# Patient Record
Sex: MALE | Race: WHITE | NOT HISPANIC OR LATINO | Employment: FULL TIME | URBAN - METROPOLITAN AREA
[De-identification: names, ages, dates, MRNs, and addresses within clinical notes are randomized per-mention and may not be internally consistent; named-entity substitution may affect disease eponyms.]

---

## 2022-08-28 ENCOUNTER — HOSPITAL ENCOUNTER (OUTPATIENT)
Facility: CLINIC | Age: 57
Setting detail: OBSERVATION
Discharge: HOME | End: 2022-08-29
Attending: HOSPITALIST | Admitting: INTERNAL MEDICINE
Payer: COMMERCIAL

## 2022-08-28 DIAGNOSIS — K92.1 GASTROINTESTINAL HEMORRHAGE WITH MELENA: ICD-10-CM

## 2022-08-28 DIAGNOSIS — M17.10 ARTHRITIS OF KNEE: Primary | ICD-10-CM

## 2022-08-28 DIAGNOSIS — K92.2 UPPER GI BLEED: ICD-10-CM

## 2022-08-28 LAB
ABO/RH(D): NORMAL
ALBUMIN SERPL BCG-MCNC: 3.8 G/DL (ref 3.5–5.2)
ALP SERPL-CCNC: 60 U/L (ref 40–129)
ALT SERPL W P-5'-P-CCNC: 18 U/L (ref 10–50)
ANION GAP SERPL CALCULATED.3IONS-SCNC: 7 MMOL/L (ref 7–15)
ANTIBODY SCREEN: NEGATIVE
AST SERPL W P-5'-P-CCNC: 23 U/L (ref 10–50)
BILIRUB SERPL-MCNC: 0.5 MG/DL
BUN SERPL-MCNC: 18.9 MG/DL (ref 6–20)
CALCIUM SERPL-MCNC: 8.1 MG/DL (ref 8.6–10)
CHLORIDE SERPL-SCNC: 105 MMOL/L (ref 98–107)
CREAT SERPL-MCNC: 0.88 MG/DL (ref 0.67–1.17)
DEPRECATED HCO3 PLAS-SCNC: 22 MMOL/L (ref 22–29)
ERYTHROCYTE [DISTWIDTH] IN BLOOD BY AUTOMATED COUNT: 14 % (ref 10–15)
GFR SERPL CREATININE-BSD FRML MDRD: >90 ML/MIN/1.73M2
GLUCOSE SERPL-MCNC: 116 MG/DL (ref 70–99)
HCT VFR BLD AUTO: 30.9 % (ref 40–53)
HGB BLD-MCNC: 10.1 G/DL (ref 13.3–17.7)
HGB BLD-MCNC: 10.2 G/DL (ref 13.3–17.7)
INR PPP: 1.16 (ref 0.85–1.15)
MAGNESIUM SERPL-MCNC: 1.8 MG/DL (ref 1.7–2.3)
MCH RBC QN AUTO: 28.3 PG (ref 26.5–33)
MCHC RBC AUTO-ENTMCNC: 32.7 G/DL (ref 31.5–36.5)
MCV RBC AUTO: 87 FL (ref 78–100)
PHOSPHATE SERPL-MCNC: 1.6 MG/DL (ref 2.5–4.5)
PHOSPHATE SERPL-MCNC: 3.1 MG/DL (ref 2.5–4.5)
PLATELET # BLD AUTO: 197 10E3/UL (ref 150–450)
POTASSIUM SERPL-SCNC: 3.9 MMOL/L (ref 3.4–5.3)
PROT SERPL-MCNC: 6.1 G/DL (ref 6.4–8.3)
RBC # BLD AUTO: 3.57 10E6/UL (ref 4.4–5.9)
SODIUM SERPL-SCNC: 134 MMOL/L (ref 136–145)
SPECIMEN EXPIRATION DATE: NORMAL
WBC # BLD AUTO: 10.3 10E3/UL (ref 4–11)

## 2022-08-28 PROCEDURE — 85018 HEMOGLOBIN: CPT | Performed by: STUDENT IN AN ORGANIZED HEALTH CARE EDUCATION/TRAINING PROGRAM

## 2022-08-28 PROCEDURE — 250N000013 HC RX MED GY IP 250 OP 250 PS 637: Performed by: STUDENT IN AN ORGANIZED HEALTH CARE EDUCATION/TRAINING PROGRAM

## 2022-08-28 PROCEDURE — 85027 COMPLETE CBC AUTOMATED: CPT | Performed by: STUDENT IN AN ORGANIZED HEALTH CARE EDUCATION/TRAINING PROGRAM

## 2022-08-28 PROCEDURE — 36415 COLL VENOUS BLD VENIPUNCTURE: CPT | Performed by: STUDENT IN AN ORGANIZED HEALTH CARE EDUCATION/TRAINING PROGRAM

## 2022-08-28 PROCEDURE — C9113 INJ PANTOPRAZOLE SODIUM, VIA: HCPCS | Performed by: STUDENT IN AN ORGANIZED HEALTH CARE EDUCATION/TRAINING PROGRAM

## 2022-08-28 PROCEDURE — 85610 PROTHROMBIN TIME: CPT | Performed by: STUDENT IN AN ORGANIZED HEALTH CARE EDUCATION/TRAINING PROGRAM

## 2022-08-28 PROCEDURE — 84100 ASSAY OF PHOSPHORUS: CPT | Performed by: STUDENT IN AN ORGANIZED HEALTH CARE EDUCATION/TRAINING PROGRAM

## 2022-08-28 PROCEDURE — 96376 TX/PRO/DX INJ SAME DRUG ADON: CPT

## 2022-08-28 PROCEDURE — 258N000003 HC RX IP 258 OP 636: Performed by: STUDENT IN AN ORGANIZED HEALTH CARE EDUCATION/TRAINING PROGRAM

## 2022-08-28 PROCEDURE — 80053 COMPREHEN METABOLIC PANEL: CPT | Performed by: STUDENT IN AN ORGANIZED HEALTH CARE EDUCATION/TRAINING PROGRAM

## 2022-08-28 PROCEDURE — 82040 ASSAY OF SERUM ALBUMIN: CPT | Performed by: STUDENT IN AN ORGANIZED HEALTH CARE EDUCATION/TRAINING PROGRAM

## 2022-08-28 PROCEDURE — G0378 HOSPITAL OBSERVATION PER HR: HCPCS

## 2022-08-28 PROCEDURE — 96374 THER/PROPH/DIAG INJ IV PUSH: CPT

## 2022-08-28 PROCEDURE — 250N000011 HC RX IP 250 OP 636: Performed by: STUDENT IN AN ORGANIZED HEALTH CARE EDUCATION/TRAINING PROGRAM

## 2022-08-28 PROCEDURE — 86850 RBC ANTIBODY SCREEN: CPT | Performed by: STUDENT IN AN ORGANIZED HEALTH CARE EDUCATION/TRAINING PROGRAM

## 2022-08-28 PROCEDURE — 96375 TX/PRO/DX INJ NEW DRUG ADDON: CPT

## 2022-08-28 PROCEDURE — 86901 BLOOD TYPING SEROLOGIC RH(D): CPT | Performed by: STUDENT IN AN ORGANIZED HEALTH CARE EDUCATION/TRAINING PROGRAM

## 2022-08-28 PROCEDURE — 83735 ASSAY OF MAGNESIUM: CPT | Performed by: STUDENT IN AN ORGANIZED HEALTH CARE EDUCATION/TRAINING PROGRAM

## 2022-08-28 PROCEDURE — 99203 OFFICE O/P NEW LOW 30 MIN: CPT | Mod: GC | Performed by: INTERNAL MEDICINE

## 2022-08-28 PROCEDURE — 99221 1ST HOSP IP/OBS SF/LOW 40: CPT | Mod: AI | Performed by: STUDENT IN AN ORGANIZED HEALTH CARE EDUCATION/TRAINING PROGRAM

## 2022-08-28 PROCEDURE — 250N000009 HC RX 250: Performed by: STUDENT IN AN ORGANIZED HEALTH CARE EDUCATION/TRAINING PROGRAM

## 2022-08-28 PROCEDURE — 120N000003 HC R&B IMCU UMMC

## 2022-08-28 RX ORDER — NALOXONE HYDROCHLORIDE 0.4 MG/ML
0.4 INJECTION, SOLUTION INTRAMUSCULAR; INTRAVENOUS; SUBCUTANEOUS
Status: DISCONTINUED | OUTPATIENT
Start: 2022-08-28 | End: 2022-08-29 | Stop reason: HOSPADM

## 2022-08-28 RX ORDER — NALOXONE HYDROCHLORIDE 0.4 MG/ML
0.2 INJECTION, SOLUTION INTRAMUSCULAR; INTRAVENOUS; SUBCUTANEOUS
Status: DISCONTINUED | OUTPATIENT
Start: 2022-08-28 | End: 2022-08-29 | Stop reason: HOSPADM

## 2022-08-28 RX ORDER — ONDANSETRON 2 MG/ML
4 INJECTION INTRAMUSCULAR; INTRAVENOUS EVERY 6 HOURS PRN
Status: DISCONTINUED | OUTPATIENT
Start: 2022-08-28 | End: 2022-08-29 | Stop reason: HOSPADM

## 2022-08-28 RX ORDER — LIDOCAINE 40 MG/G
CREAM TOPICAL
Status: DISCONTINUED | OUTPATIENT
Start: 2022-08-28 | End: 2022-08-29 | Stop reason: HOSPADM

## 2022-08-28 RX ORDER — ACETAMINOPHEN 325 MG/1
650 TABLET ORAL EVERY 6 HOURS PRN
Status: DISCONTINUED | OUTPATIENT
Start: 2022-08-28 | End: 2022-08-29 | Stop reason: HOSPADM

## 2022-08-28 RX ORDER — LANOLIN ALCOHOL/MO/W.PET/CERES
3 CREAM (GRAM) TOPICAL
Status: DISCONTINUED | OUTPATIENT
Start: 2022-08-28 | End: 2022-08-29 | Stop reason: HOSPADM

## 2022-08-28 RX ORDER — OXYCODONE HYDROCHLORIDE 5 MG/1
5 TABLET ORAL EVERY 6 HOURS PRN
Status: DISCONTINUED | OUTPATIENT
Start: 2022-08-28 | End: 2022-08-29 | Stop reason: HOSPADM

## 2022-08-28 RX ADMIN — PANTOPRAZOLE SODIUM 40 MG: 40 INJECTION, POWDER, FOR SOLUTION INTRAVENOUS at 20:00

## 2022-08-28 RX ADMIN — OXYCODONE HYDROCHLORIDE 5 MG: 5 TABLET ORAL at 17:06

## 2022-08-28 RX ADMIN — POTASSIUM PHOSPHATE, MONOBASIC AND POTASSIUM PHOSPHATE, DIBASIC 15 MMOL: 224; 236 INJECTION, SOLUTION, CONCENTRATE INTRAVENOUS at 12:32

## 2022-08-28 RX ADMIN — POTASSIUM PHOSPHATE, MONOBASIC AND POTASSIUM PHOSPHATE, DIBASIC 15 MMOL: 224; 236 INJECTION, SOLUTION, CONCENTRATE INTRAVENOUS at 14:34

## 2022-08-28 RX ADMIN — ONDANSETRON 4 MG: 2 INJECTION INTRAMUSCULAR; INTRAVENOUS at 20:02

## 2022-08-28 RX ADMIN — PANTOPRAZOLE SODIUM 40 MG: 40 INJECTION, POWDER, FOR SOLUTION INTRAVENOUS at 11:52

## 2022-08-28 RX ADMIN — OXYCODONE HYDROCHLORIDE 5 MG: 5 TABLET ORAL at 07:47

## 2022-08-28 ASSESSMENT — ACTIVITIES OF DAILY LIVING (ADL)
ADLS_ACUITY_SCORE: 18
ADLS_ACUITY_SCORE: 18
WALKING_OR_CLIMBING_STAIRS_DIFFICULTY: NO
CHANGE_IN_FUNCTIONAL_STATUS_SINCE_ONSET_OF_CURRENT_ILLNESS/INJURY: NO
ADLS_ACUITY_SCORE: 18
HEARING_DIFFICULTY_OR_DEAF: NO
ADLS_ACUITY_SCORE: 18
DOING_ERRANDS_INDEPENDENTLY_DIFFICULTY: NO
DIFFICULTY_EATING/SWALLOWING: NO
ADLS_ACUITY_SCORE: 18
ADLS_ACUITY_SCORE: 18
DIFFICULTY_COMMUNICATING: NO
CONCENTRATING,_REMEMBERING_OR_MAKING_DECISIONS_DIFFICULTY: NO
FALL_HISTORY_WITHIN_LAST_SIX_MONTHS: NO
ADLS_ACUITY_SCORE: 18
ADLS_ACUITY_SCORE: 35
WEAR_GLASSES_OR_BLIND: NO
ADLS_ACUITY_SCORE: 18
ADLS_ACUITY_SCORE: 18
DRESSING/BATHING_DIFFICULTY: NO
TOILETING_ISSUES: NO

## 2022-08-28 NOTE — PROGRESS NOTES
[facility] 0500 accompanied by EMS. GCS 15, ambulated from stretcher to bed without issue. Connected to cardiac monitor, hemodynamically stable.No emesis or bowel movement since black stool yesterday. Saturating 98% on room air. No pressure injury noted, bruise to left lower extremity witnessed by Jyotsna HAMMOND.Peripheral line in place.

## 2022-08-28 NOTE — H&P
[facility]    History and Physical - Medicine Service, LEYLA TEAM        Date of Admission:  8/28/2022    Assessment & Plan      Angelo Méndez is a 56 year old male admitted on 8/28/2022. He has a history of MI s/p PCI x3 and is admitted for upper GI bleed.    # Upper GI bleed  Presented with 1 day of melena x3 episodes. Hemodynamically stable, Hgb 10.7 on arrival to outside ED (from baseline 13). Uses ASA, no blood thinners, ibuprofen. Likely gastritis vs PUD vs dieulafoy vs other. No liver hx concerning for variceal/portal HTN.  - GI consulted appreciate recs  - IV PPI BID  - NPO  - Zofran PRN for nausea, oxycodone PRN for pain  - Maintain 2 large bore IV  - Type and screen  - Tele  - Recheck CBC now, interval checks based on hgb change    # Hx of MI s/p PCI x3   - Hold PTA aspirin given bleed  - Hold PTA metoprolol     Diet: NPO for Medical/Clinical Reasons Except for: Meds    DVT Prophylaxis: Pneumatic Compression Devices  Clark Catheter: Not present  Fluids: None  Central Lines: None  Cardiac Monitoring: ACTIVE order. Indication: GI bleed  Code Status: Full Code          Disposition Plan     Home following GI intervention, bleeding stabilized    Case to be formally staffed in AM    Danielle Waterman MD  Medicine Service, LEYLA TEAM   [facility]  Securely message with the Vocera Web Console (learn more here)  Text page via [facility] Paging/Directory   Please see signed in provider for up to date coverage information    ______________________________________________________________________    Chief Complaint   Black stools    History is obtained from the patient    History of Present Illness   Angelo Méndez is a 56 year old male who has a history of MI s/p PCI (20+ years ago) and is admitted for melena.    Pt had 2 episodes of melena on 8/27, formed BMs but black and tarry associated with increased fatigue. Had  additional formed black BM at 1800. Has had some intermittent lightheadedness on 8/26-8/27. No hematochezia. Intermittent nausea, no emesis. Has some gas pain with BMs but no persistent abdominal pain. No other signs of bleeding. Pt takes ASA daily, sometimes 2-3  mg daily (takes additional for headaches) sometimes 2-3 ASA 81 mg daily. Doesn't take ibuprofen or tylenol at home. No pepto bismol, iron.    Pt was planning to leave for 3 week vacation to [place] with fiance 8/28.     Outside ED:  - Vitals: afebrile, hemodynamically stable   - Labs: hgb 10.9 (from baseline 13), Na 135, BUN 33, Cr 1.1  - Imaging: none  - Interventions: IV protonix x1, oxycodone x1 for headache    Review of Systems    The 10 point Review of Systems is negative other than noted in the HPI or here.     Past Medical History    I have reviewed this patient's medical history and updated it with pertinent information if needed.   No past medical history on file.   MI 20+ years ago, s/p PCI x3 per pt report    Past Surgical History   I have reviewed this patient's surgical history and updated it with pertinent information if needed.  No past surgical history on file.     Social History   I have reviewed this patient's social history and updated it with pertinent information if needed. Angelo Méndez  denies alcohol use, tobacco use, or other drug use.    Family History   Non-contributory    Prior to Admission Medications   None     Allergies   Not on File    Physical Exam   Vital Signs: Temp: 97.7  F (36.5  C) Temp src: Oral BP: 130/70 Pulse: 77            Weight: 220 lbs 14.41 oz    General Appearance: well appearing male in NAD lying in bed  Eyes: anicteric sclera  HEENT: NCAT, mildly dry mucous membranes, clear oropharynx  Respiratory: Non-labored on room air, LCTAB  Cardiovascular: RRR, no murmurs, <2 sec capillary refill, intact distal pulses  GI: Soft, non-distended, minimally tender to palpation on RUQ and RLQ, no rebound or  guarding  Skin: No rashes on exposed skin  Extremities: WWP, no peripheral edema  Neurologic: AAOx4, CN II-XII grossly intact, GUILLEN  Psychiatric: calm and cooperative    Data   Data reviewed today: I reviewed all medications, new labs and imaging results over the last 24 hours. I personally reviewed CBC, BMP, type and screen in care everywhere in addition to labs/imaging here.    No lab results found in last 7 days.    No results found for this or any previous visit (from the past 24 hour(s)).

## 2022-08-28 NOTE — PLAN OF CARE
"/80 (BP Location: Right arm)   Pulse 73   Temp 98.4  F (36.9  C) (Oral)   Resp 16   Ht 1.854 m (6' 1\")   Wt 100.2 kg (220 lb 14.4 oz)   SpO2 93%   BMI 29.14 kg/m      Shift: 2434-0206  Reason for Admission: Upper GI bleed  VS: VSS on RA. NSR  Neuros: A/Ox4, able to make needs known. Neuros intact  GI/: No BMs reported this shift. Voiding adequately   Nutrition: Tolerating regular diet. NPO at midnight.   Drains/Lines: R. PIV- TKO  Activity: Pt up ad wendy  Pain/Nausea: C/o of headache this AM- PRN Oxycodone 5mg given. Denies nausea  Respiratory: Sats >95% on RA  Skin: Intact  Labs: Phos replaced, pending 1900 recheck.  Plan of care: Continue on IV protonix. Plan for endoscopy tomorrow and possible discharge afterwards. Will continue to monitor and follow POC.     "

## 2022-08-28 NOTE — LETTER
August 29, 2022      Angelo Méndez  [address]  [place] [facility] [address]        To Whom It May Concern:    Angelo Méndez  was admitted in the hospital from 8/28/22 to 8/29/22.  Please make accomodation for his travel plan disruptions and excuse him from work for this duration.         Sincerely,  Denise Bey DO, MS   of Medicine  General Internal Medicine  [facility]

## 2022-08-28 NOTE — CONSULTS
GASTROENTEROLOGY CONSULTATION      Date of Admission:  8/28/2022          ASSESSMENT AND RECOMMENDATIONS:   Assessment:  Angelo Méndez is a 56 year old male with a past medical history of MI s/p PCI x3 admitted for melena and acute anemia.     #Melena with acute anemia  Pt is stable, and has a hx of NSAID use.      Recommendations:  -ok to have regular diet today  -cont IV PPI BID  -2 large bore IVs  -transfuse for hgb < 7   -BID Hgb  -NPO midnight tonight for procedure in AM Monday       Gastroenterology team will continue to follow with you.    Thank you for involving us in this patient's care. Please do not hesitate to contact the GI service with any questions or concerns.     Pt care plan discussed with GI staff physician, Dr. Wilder.    Nancy Philippe MD PhD   Gastroenterology Fellow      -------------------------------------------------------------------------------------------------------------------          Chief Complaint:   We were asked by Dr Waterman of medicine to evaluate this patient with melena.    History is obtained from the patient and the medical record.          History of Present Illness:   Patient reports 1 day of 3 episodes of dark formed stools, an weakness. He takes daily aspirin for hx of PCI x3.               Past Medical History:   Reviewed and edited as appropriate  No past medical history on file.  See above       Past Surgical History:   Reviewed and edited as appropriate   No past surgical history on file.  None       Previous Endoscopy:   No results found for this or any previous visit.  No prior endoscopies seen in the chart.          Social History:   Reviewed and edited as appropriate  Social History     Socioeconomic History     Marital status: Single     Spouse name: Not on file     Number of children: Not on file     Years of education: Not on file     Highest education level: Not on file   Occupational History     Not on file   Tobacco Use     Smoking status: Not on  file     Smokeless tobacco: Not on file   Substance and Sexual Activity     Alcohol use: Not on file     Drug use: Not on file     Sexual activity: Not on file   Other Topics Concern     Not on file   Social History Narrative     Not on file     Social Determinants of Health     Financial Resource Strain: Not on file   Food Insecurity: Not on file   Transportation Needs: Not on file   Physical Activity: Not on file   Stress: Not on file   Social Connections: Not on file   Intimate Partner Violence: Not on file   Housing Stability: Not on file            Family History:   Reviewed and edited as appropriate  No family history on file.          Allergies:   Reviewed and edited as appropriate     Allergies   Allergen Reactions     Azithromycin GI Disturbance, Diarrhea, Nausea and Nausea and Vomiting     Ibuprofen      Metoprolol Other (See Comments)     Fatigue             Medications:     Current Facility-Administered Medications   Medication     acetaminophen (TYLENOL) tablet 650 mg     lidocaine (LMX4) cream     lidocaine 1 % 0.1-1 mL     melatonin tablet 3 mg     naloxone (NARCAN) injection 0.2 mg    Or     naloxone (NARCAN) injection 0.4 mg    Or     naloxone (NARCAN) injection 0.2 mg    Or     naloxone (NARCAN) injection 0.4 mg     ondansetron (ZOFRAN) injection 4 mg     oxyCODONE (ROXICODONE) tablet 5 mg     pantoprazole (PROTONIX) IV push injection 40 mg     sodium chloride (PF) 0.9% PF flush 3 mL     sodium chloride (PF) 0.9% PF flush 3 mL             Review of Systems:     A complete review of systems was performed and is negative except as noted in the HPI           Physical Exam:   /84 (BP Location: Right arm)   Pulse 71   Temp 98  F (36.7  C) (Oral)   Resp 16   Wt 100.2 kg (220 lb 14.4 oz)   SpO2 98%   Wt:   Wt Readings from Last 2 Encounters:   08/28/22 100.2 kg (220 lb 14.4 oz)      Constitutional: cooperative, pleasant, NAD  Eyes: Sclera anicteric/not injected  Ears/nose/mouth/throat: MMM  CV:  Regular  Respiratory: Unlabored breathing on RA  Abd: soft, nondistended, no hepatosplenomegaly, nontender, no rebound  Skin: warm, perfused, no jaundice  Neuro: AAO x 3  Psych: Normal affect         Data:   Labs and imaging below were independently reviewed and interpreted    BMP  Recent Labs   Lab 08/28/22  0748   *   POTASSIUM 3.9   CHLORIDE 105   LATONYA 8.1*   CO2 22   BUN 18.9   CR 0.88   *     CBC  Recent Labs   Lab 08/28/22  0748   WBC 10.3   RBC 3.57*   HGB 10.1*   HCT 30.9*   MCV 87   MCH 28.3   MCHC 32.7   RDW 14.0        INR  Recent Labs   Lab 08/28/22  0748   INR 1.16*     LFTs  Recent Labs   Lab 08/28/22  0748   ALKPHOS 60   AST 23   ALT 18   BILITOTAL 0.5   PROTTOTAL 6.1*   ALBUMIN 3.8      PANCNo lab results found in last 7 days.    Imaging:  No recent imaging.

## 2022-08-29 ENCOUNTER — TELEPHONE (OUTPATIENT)
Dept: GASTROENTEROLOGY | Facility: CLINIC | Age: 57
End: 2022-08-29

## 2022-08-29 VITALS
BODY MASS INDEX: 29.45 KG/M2 | OXYGEN SATURATION: 91 % | WEIGHT: 222.22 LBS | SYSTOLIC BLOOD PRESSURE: 115 MMHG | DIASTOLIC BLOOD PRESSURE: 72 MMHG | HEIGHT: 73 IN | RESPIRATION RATE: 12 BRPM | TEMPERATURE: 98 F | HEART RATE: 65 BPM

## 2022-08-29 PROBLEM — M17.10 ARTHRITIS OF KNEE: Status: ACTIVE | Noted: 2022-08-29

## 2022-08-29 PROBLEM — K92.1 GASTROINTESTINAL HEMORRHAGE WITH MELENA: Status: ACTIVE | Noted: 2022-08-29

## 2022-08-29 LAB
ALBUMIN SERPL BCG-MCNC: 3.7 G/DL (ref 3.5–5.2)
ALP SERPL-CCNC: 62 U/L (ref 40–129)
ALT SERPL W P-5'-P-CCNC: 22 U/L (ref 10–50)
ANION GAP SERPL CALCULATED.3IONS-SCNC: 7 MMOL/L (ref 7–15)
AST SERPL W P-5'-P-CCNC: 23 U/L (ref 10–50)
BASOPHILS # BLD AUTO: 0 10E3/UL (ref 0–0.2)
BASOPHILS NFR BLD AUTO: 0 %
BILIRUB SERPL-MCNC: 0.5 MG/DL
BUN SERPL-MCNC: 12 MG/DL (ref 6–20)
CALCIUM SERPL-MCNC: 8.4 MG/DL (ref 8.6–10)
CHLORIDE SERPL-SCNC: 101 MMOL/L (ref 98–107)
CREAT SERPL-MCNC: 0.93 MG/DL (ref 0.67–1.17)
DEPRECATED HCO3 PLAS-SCNC: 22 MMOL/L (ref 22–29)
EOSINOPHIL # BLD AUTO: 0.2 10E3/UL (ref 0–0.7)
EOSINOPHIL NFR BLD AUTO: 3 %
ERYTHROCYTE [DISTWIDTH] IN BLOOD BY AUTOMATED COUNT: 13.9 % (ref 10–15)
FERRITIN SERPL-MCNC: 92 NG/ML (ref 31–409)
GFR SERPL CREATININE-BSD FRML MDRD: >90 ML/MIN/1.73M2
GLUCOSE BLDC GLUCOMTR-MCNC: 133 MG/DL (ref 70–99)
GLUCOSE SERPL-MCNC: 108 MG/DL (ref 70–99)
HCT VFR BLD AUTO: 29.9 % (ref 40–53)
HGB BLD-MCNC: 9.9 G/DL (ref 13.3–17.7)
IMM GRANULOCYTES # BLD: 0 10E3/UL
IMM GRANULOCYTES NFR BLD: 0 %
IRON SERPL-MCNC: 54 UG/DL (ref 61–157)
LYMPHOCYTES # BLD AUTO: 1.9 10E3/UL (ref 0.8–5.3)
LYMPHOCYTES NFR BLD AUTO: 26 %
MAGNESIUM SERPL-MCNC: 1.7 MG/DL (ref 1.7–2.3)
MCH RBC QN AUTO: 28.3 PG (ref 26.5–33)
MCHC RBC AUTO-ENTMCNC: 33.1 G/DL (ref 31.5–36.5)
MCV RBC AUTO: 85 FL (ref 78–100)
MONOCYTES # BLD AUTO: 0.5 10E3/UL (ref 0–1.3)
MONOCYTES NFR BLD AUTO: 7 %
NEUTROPHILS # BLD AUTO: 4.7 10E3/UL (ref 1.6–8.3)
NEUTROPHILS NFR BLD AUTO: 64 %
NRBC # BLD AUTO: 0 10E3/UL
NRBC BLD AUTO-RTO: 0 /100
PHOSPHATE SERPL-MCNC: 2.9 MG/DL (ref 2.5–4.5)
PLATELET # BLD AUTO: 194 10E3/UL (ref 150–450)
POTASSIUM SERPL-SCNC: 3.6 MMOL/L (ref 3.4–5.3)
PROT SERPL-MCNC: 6.1 G/DL (ref 6.4–8.3)
RBC # BLD AUTO: 3.5 10E6/UL (ref 4.4–5.9)
SARS-COV-2 RNA RESP QL NAA+PROBE: NEGATIVE
SODIUM SERPL-SCNC: 130 MMOL/L (ref 136–145)
UPPER GI ENDOSCOPY: NORMAL
WBC # BLD AUTO: 7.3 10E3/UL (ref 4–11)

## 2022-08-29 PROCEDURE — 84100 ASSAY OF PHOSPHORUS: CPT | Performed by: STUDENT IN AN ORGANIZED HEALTH CARE EDUCATION/TRAINING PROGRAM

## 2022-08-29 PROCEDURE — 120N000003 HC R&B IMCU UMMC

## 2022-08-29 PROCEDURE — 83735 ASSAY OF MAGNESIUM: CPT | Performed by: STUDENT IN AN ORGANIZED HEALTH CARE EDUCATION/TRAINING PROGRAM

## 2022-08-29 PROCEDURE — 83540 ASSAY OF IRON: CPT | Performed by: STUDENT IN AN ORGANIZED HEALTH CARE EDUCATION/TRAINING PROGRAM

## 2022-08-29 PROCEDURE — 82728 ASSAY OF FERRITIN: CPT | Performed by: STUDENT IN AN ORGANIZED HEALTH CARE EDUCATION/TRAINING PROGRAM

## 2022-08-29 PROCEDURE — U0003 INFECTIOUS AGENT DETECTION BY NUCLEIC ACID (DNA OR RNA); SEVERE ACUTE RESPIRATORY SYNDROME CORONAVIRUS 2 (SARS-COV-2) (CORONAVIRUS DISEASE [COVID-19]), AMPLIFIED PROBE TECHNIQUE, MAKING USE OF HIGH THROUGHPUT TECHNOLOGIES AS DESCRIBED BY CMS-2020-01-R: HCPCS | Performed by: STUDENT IN AN ORGANIZED HEALTH CARE EDUCATION/TRAINING PROGRAM

## 2022-08-29 PROCEDURE — 80053 COMPREHEN METABOLIC PANEL: CPT | Performed by: STUDENT IN AN ORGANIZED HEALTH CARE EDUCATION/TRAINING PROGRAM

## 2022-08-29 PROCEDURE — G0500 MOD SEDAT ENDO SERVICE >5YRS: HCPCS | Performed by: INTERNAL MEDICINE

## 2022-08-29 PROCEDURE — 250N000011 HC RX IP 250 OP 636: Performed by: STUDENT IN AN ORGANIZED HEALTH CARE EDUCATION/TRAINING PROGRAM

## 2022-08-29 PROCEDURE — 43239 EGD BIOPSY SINGLE/MULTIPLE: CPT | Performed by: INTERNAL MEDICINE

## 2022-08-29 PROCEDURE — 250N000011 HC RX IP 250 OP 636: Performed by: INTERNAL MEDICINE

## 2022-08-29 PROCEDURE — 85025 COMPLETE CBC W/AUTO DIFF WBC: CPT | Performed by: STUDENT IN AN ORGANIZED HEALTH CARE EDUCATION/TRAINING PROGRAM

## 2022-08-29 PROCEDURE — 250N000009 HC RX 250: Performed by: INTERNAL MEDICINE

## 2022-08-29 PROCEDURE — 96376 TX/PRO/DX INJ SAME DRUG ADON: CPT

## 2022-08-29 PROCEDURE — G0378 HOSPITAL OBSERVATION PER HR: HCPCS

## 2022-08-29 PROCEDURE — 88305 TISSUE EXAM BY PATHOLOGIST: CPT | Mod: TC | Performed by: INTERNAL MEDICINE

## 2022-08-29 PROCEDURE — 36415 COLL VENOUS BLD VENIPUNCTURE: CPT | Performed by: STUDENT IN AN ORGANIZED HEALTH CARE EDUCATION/TRAINING PROGRAM

## 2022-08-29 PROCEDURE — 99217 PR OBSERVATION CARE DISCHARGE: CPT | Performed by: STUDENT IN AN ORGANIZED HEALTH CARE EDUCATION/TRAINING PROGRAM

## 2022-08-29 PROCEDURE — C9113 INJ PANTOPRAZOLE SODIUM, VIA: HCPCS | Performed by: STUDENT IN AN ORGANIZED HEALTH CARE EDUCATION/TRAINING PROGRAM

## 2022-08-29 RX ORDER — ONDANSETRON 2 MG/ML
INJECTION INTRAMUSCULAR; INTRAVENOUS PRN
Status: COMPLETED | OUTPATIENT
Start: 2022-08-29 | End: 2022-08-29

## 2022-08-29 RX ORDER — PANTOPRAZOLE SODIUM 40 MG/1
40 TABLET, DELAYED RELEASE ORAL 2 TIMES DAILY
Qty: 120 TABLET | Refills: 1 | Status: SHIPPED | OUTPATIENT
Start: 2022-08-29

## 2022-08-29 RX ORDER — FENTANYL CITRATE 50 UG/ML
INJECTION, SOLUTION INTRAMUSCULAR; INTRAVENOUS PRN
Status: COMPLETED | OUTPATIENT
Start: 2022-08-29 | End: 2022-08-29

## 2022-08-29 RX ADMIN — ONDANSETRON 4 MG: 2 INJECTION INTRAMUSCULAR; INTRAVENOUS at 09:12

## 2022-08-29 RX ADMIN — PANTOPRAZOLE SODIUM 40 MG: 40 INJECTION, POWDER, FOR SOLUTION INTRAVENOUS at 08:17

## 2022-08-29 RX ADMIN — MIDAZOLAM 1 MG: 1 INJECTION INTRAMUSCULAR; INTRAVENOUS at 09:26

## 2022-08-29 RX ADMIN — TOPICAL ANESTHETIC 1 SPRAY: 200 SPRAY DENTAL; PERIODONTAL at 09:24

## 2022-08-29 RX ADMIN — FENTANYL CITRATE 50 MCG: 50 INJECTION, SOLUTION INTRAMUSCULAR; INTRAVENOUS at 09:26

## 2022-08-29 RX ADMIN — FENTANYL CITRATE 100 MCG: 50 INJECTION, SOLUTION INTRAMUSCULAR; INTRAVENOUS at 09:24

## 2022-08-29 RX ADMIN — MIDAZOLAM 2 MG: 1 INJECTION INTRAMUSCULAR; INTRAVENOUS at 09:24

## 2022-08-29 ASSESSMENT — ACTIVITIES OF DAILY LIVING (ADL)
ADLS_ACUITY_SCORE: 18

## 2022-08-29 NOTE — OR NURSING
Pt underwent EGD with biopsies under conscious sedation. Specimens sent to lab. Report called to 6B RN and pt transported back to floor.     Sruthi Rubio RN

## 2022-08-29 NOTE — DISCHARGE SUMMARY
[facility]  Hospitalist Discharge Summary      Date of Admission:  8/28/2022  Date of Discharge:  8/29/2022  2:19 PM  Discharging Provider: Denise eBy MD  Discharge Service: Hospitalist Service, GOLD TEAM 10    Discharge Diagnoses   Upper GI bleed  Acute Blood Loss Anemia  Peptic Ulcer    Follow-ups Needed After Discharge   Follow-up Appointments     Adult [facility] Follow-up and recommended labs and tests      Follow up with primary care provider, Amaya Casey, within 7 days for   hospital follow- up.  The following labs/tests are recommended: CBC.      Appointments on [place] and/or [facility] (with [facility] or [facility]   provider or service). Call [phone] if you haven't heard regarding   these appointments within 7 days of discharge.           Unresulted Labs Ordered in the Past 30 Days of this Admission     Date and Time Order Name Status Description    8/29/2022  9:45 AM Surgical Pathology Exam In process       These results will be followed up by GI    Discharge Disposition   Discharged to home  Condition at discharge: Stable      Hospital Course   55 y/o M with PMH of PCI in 2016 who takes 81mg asa x 2 daily as well as 325mg asa for arthritic pain and headache several times a week presented with melena and ABLA.  EGD on 8/29/22 showed peptic ulcer x 2.    - BID PPI for 2 months and repeat EGD, GI to schedule  - At discharge will cut down to daily 81mg asa to start 1 week from today.   Diclofenac gel prescribed to replace asa 325.  Patient refuses to take tylenol.   - Iron level slightly low at 54 but margaret level 94 which is WNL.  CTM    Regarding CAD:  - Patient apparently takes metoprolol PRN.  Discuss with PCP  - Patient apparently doesn't take statin.  Discuss with PCP.             Consultations This Hospital Stay   GI LUMINAL ADULT IP CONSULT    Code Status   Full Code    Time Spent on this Encounter   I, Denise Bey MD, personally saw the  patient today and spent greater than 30 minutes discharging this patient.       Denise Bey MD  [facility] [address] [address]  Phone: [phone]  ______________________________________________________________________    Physical Exam   General Appearance: NAD  Respiratory: Breathing well on RA  Cardiovascular:   GI: soft, NTND  Skin:  No rash on exposed areas  Other:  A little sleepy but awake and oriented, seen post-EGD.  Prior to discharge patient was walking around.         Primary Care Physician   Amaya Casey    Discharge Orders      Adult GI  Referral - Procedure Only      Reason for your hospital stay    You came in with blood in your stool from peptic ulcer.  You underwent an endoscopy.  The GI doctor will call you if you have H. Pylori and need antibiotic.  For now, please take the anti-reflux medication for 2 months at least until you have another endoscopy done.  The GI doctor will schedule that and will call you with time.  Please follow-up with your primary care doctor within a week.     Activity    Your activity upon discharge: activity as tolerated     Adult [facility] Follow-up and recommended labs and tests    Follow up with primary care provider, Amaya Casey, within 7 days for hospital follow- up.  The following labs/tests are recommended: CBC.      Appointments on [place] and/or [facility] (with [facility] or [facility] provider or service). Call [phone] if you haven't heard regarding these appointments within 7 days of discharge.     Diet    Heart healthy diet       Significant Results and Procedures       Discharge Medications   Discharge Medication List as of 8/29/2022 12:07 PM      START taking these medications    Details   diclofenac (VOLTAREN) 1 % topical gel Apply 4 g topically daily as needed for moderate pain (Apply to painful areas in knees), Disp-350 g, R-0, E-Prescribe      pantoprazole (PROTONIX) 40 MG EC tablet  Take 1 tablet (40 mg) by mouth 2 times daily, Disp-120 tablet, R-1, E-Prescribe         CONTINUE these medications which have NOT CHANGED    Details   aspirin (ASA) 81 MG EC tablet Take 1 tablet (81 mg) by mouth daily, Historical           Allergies   Allergies   Allergen Reactions     Azithromycin GI Disturbance, Diarrhea, Nausea and Nausea and Vomiting     Ibuprofen

## 2022-08-29 NOTE — PROCEDURES
Gastroenterology Endoscopy Suite Brief Operative Note    Procedure:  Upper endoscopy   Post-operative diagnosis:  Gastric ulcers x 2   Staff Physician:  Dr. Jim Wilder   Fellow/Assistant(s):  Beto Hodge MD    Specimens:  Please see final procedure note for further details.   Findings:  1. Clean based gastric ulcers x 2. Biopsied. 2. Small to medium amount of food debris. 3. Submucosal lesion in 2nd portion of duodenum (positive pillow test suggests benign lipoma).   Complications:  None.   Condition:  Stable   Recommendations  Diet:  Regular diet  PPI:  PPI po BID - take until repeat EGD in 2-3 months  Anti-coagulants/platelets:  Hold baby aspirin x 1 week. Then can take baby ASA but do not take more aspirin or other NSAIDs  Octreotide:  N/A  Discharge Planning:   Ok to discharge from GI perspective

## 2022-08-29 NOTE — TELEPHONE ENCOUNTER
Screening Questions    BlueKIND OF PREP RedLOCATION [review exclusion criteria] GreenSEDATION TYPE      1. Are you active on mychart? Y    2. What insurance is in the chart? [employer]      3.   Ordering/Referring Provider: Salma Mckeon PA-C  4. BMI   (If greater than 40 review exclusion criteria [PAC APPT IF [MAC] @ [facility])  29.32  [If yes, BMI OVER 40-EXTENDED PREP]      **(Sedation review/consideration needed)**  Do you have a legal guardian or Medical Power of    and/or are you able to give consent for your medical care?   Y SELF    5. Have you had a positive covid test in the last 90 days?   N -     6.  Are you currently on dialysis?   N [ If yes, G-PREP & HOSPITAL setting ONLY]     7.  Do you have chronic kidney disease?  N [ If yes, G-PREP ]    8.   Do you have a diagnosis of diabetes?   N   [ If yes, G-PREP ]    9.  On a regular basis do you go 3-5 days between bowel movements?      [ If yes, EXTENDED PREP]    10.  Are you taking any prescription pain medications on a routine schedule?    N -  [ If yes, EXTENDED PREP] [If yes, MAC]      11.   Do you have any chemical dependencies such as alcohol, street drugs, or methadone?    N [If yes, MAC]    12.   Do you have any history of post-traumatic stress syndrome, severe anxiety or history of psychosis?    N  [If yes, MAC]    13.  [FEMALES] Are you currently pregnant?     If yes, how many weeks?       Respiratory/Heart Screening:  [If yes to any of the following HOSPITAL setting only]     14. Do you have Pulmonary Hypertension [LUngs]?   N       15. Do you have UNCONTROLLED asthma?   N     16.  Do you use daily home oxygen?  N      17. Do you have mod to severe Obstructive Sleep Apnea?         (OKAY @  [facility]  SH  PH  RI  MG - if pt is not on OXYGEN)  N      18.   Have you had a heart or lung transplant?   N      19.   Have you had a stroke or Transient ischemic attack (TIA - aka  mini stroke ) within 6 months?  (If yes, please review exclusion  criteria)  N     20.   In the past 6 months, have you had any heart related issues including cardiomyopathy or heart attack?   N           If yes, did it require cardiac stenting or other implantable device?   N      21.   Do you have any implantable devices in your body (pacemaker, defib, LVAD)? (If yes, please review exclusion criteria)  N   22.  Do you take the medication Phentermine?  NO        23. Do you take nitroglycerin?   N           If yes, how often?   (if yes, HOSPITAL setting ONLY)    24.  Are you currently taking any blood thinners?    [If yes, INFORM patient to follw suha w/ ORDERING PROVIDER FOR BRIDGING INSTRUCTIONS]     JUST ASPRIN    25.   Do you transfer independently?                (If NO, please HOSPITAL setting ONLY)  Y    26.   Preferred LOCAL Pharmacy for Pre Prescription:      Data Unavailable    Scheduling Details  (Please ask for phone number if not scheduled by patient)      Caller : Birgit  Date of Procedure:   Surgeon: Meño  Location:     Salma Mckeon PA-C P Endoscopy Scheduling [facility],     Could you please assist in scheduling:     Procedure/Appointment/Imaging: EGD   Physician: Dr. Wilder   Timin-3 months   Procedure length: provider average   Anesthesia: conscious sedation or MAC   Dx: Gastric ulcer   Location: [place]   Tier: 3     Comments:   Patient currently admitted but likely discharge .     Thank you,   Salma Mckeon PA-C       Sedation Type:  l   Conscious Sedation- Needs  for 6 hours after the procedure  MAC/General-Needs  for 24 hours after procedure     :[Pre-op Required] at [facility]  SH  MG and OR for MAC sedation   (advise patient they will need a pre-op WITH IN 30 DAYS of procedure date)     Type of Procedure Scheduled:   Upper Endoscopy [EGD]    Which Colonoscopy Prep was Sent?:    -     SOCORRO CF PATIENTS & GROEN'S PATIENTS NEEDS EXTENDED PREP       Informed patient they will need an adult  y  Cannot take any  type of public or medical transportation alone    Pre-Procedure Covid test to be completed at [facility] or Externally:   **INFORMED OF HOME TESTING & LAB OPTION**        Confirmed Nurse will call to complete assessment     Additional comments: Patient at the end of the questions needs to even make sure it is ok to have this done out of network. He was sent to us due to only place available to have done. He will call back if he can do with us.

## 2022-08-29 NOTE — PHARMACY-ADMISSION MEDICATION HISTORY
Admission Medication History Completed by Pharmacy    See [facility] Admission Navigator for allergy information, preferred outpatient pharmacy, prior to admission medications and immunization status.     Medication History Sources:     Surescripts, Epic records, [facility] records    Changes made to PTA medication list (reason):    Added: None    Deleted: None    Changed: None    Additional Information:    In the last year patient stated several times he does not wish to take prescriptions meds and prefers diet modification.    Prior to Admission medications    Medication Sig Last Dose Taking? Auth Provider Long Term End Date   aspirin (ASA) 81 MG EC tablet Take 1 tablet (81 mg) by mouth daily  Yes Denise Bey MD                           Date completed: 08/29/22    Medication history completed by: Luz Elena Wells, PharmD

## 2022-08-29 NOTE — UTILIZATION REVIEW
"  Admission Status; Secondary Review Determination         Under the authority of the Utilization Management Committee, the utilization review process indicated a secondary review on the above patient.  The review outcome is based on review of the medical records, discussions with staff, and applying clinical experience noted on the date of the review.        ()      Inpatient Status Appropriate - This patient's medical care is consistent with medical management for inpatient care and reasonable inpatient medical practice.      (X) Observation Status Appropriate - This patient does not meet hospital inpatient criteria and is placed in observation status. If this patient's primary payer is Medicare and was admitted as an inpatient, Condition Code 44 should be used and patient status changed to \"observation\".   () Admission Status NOT Appropriate - This patient's medical care is not consistent with medical management for Inpatient or Observation Status.          RATIONALE FOR DETERMINATION     57 y/o M with PMH of PCI in 2016 who takes 81mg asa x 2 daily as well as 325mg asa for arthritic pain and headache several times a week presented with melena and ABLA.   Vital signs were normal.  Hemoglobin was 10.7.  Patient was initiated on IV Protonix.  GI was consulted.  Hemoglobins were trended.  Upper GI endoscopy is planned.  Patient is appropriate for observation status.  I spoke to Dr Bey.    The severity of illness, intensity of service provided, expected LOS and risk for adverse outcome make the care complex, high risk and appropriate for hospital admission.        The information on this document is developed by the utilization review team in order for the business office to ensure compliance.  This only denotes the appropriateness of proper admission status and does not reflect the quality of care rendered.         The definitions of Inpatient Status and Observation Status used in making the determination above " are those provided in the CMS Coverage Manual, Chapter 1 and Chapter 6, section 70.4.      Sincerely,     Fabricio Tinoco MD  Physician Advisor  Utilization Review/ Case Management  [facility].

## 2022-08-29 NOTE — PROGRESS NOTES
DISCHARGE                         No discharge date for patient encounter.  ----------------------------------------------------------------------------  Discharged to: Home  Via: private transportation  Accompanied by:  significant other will  at main entrance.  Discharge Instructions: *diet, *activity, medications, follow up appointments, when to call the MD, aftercare instructions discussed with patient.  Prescriptions: Ffilled by pharmacy; medication list reviewed & sent with pt  Follow Up Appointments: arranged; information given  Belongings: All sent with pt  IV: d/c'd  Telemetry: d/c'd  Pt exhibits understanding of above discharge instructions; all questions answered.      Discharge Paperwork: Signed, copied, and sent home with patient.

## 2022-08-29 NOTE — PLAN OF CARE
Neuro: A&Ox4. Able to make needs known.  Cardiac: SR. HR 60-70s. VSS. Denies chest pain.   Respiratory: Sating >95% on RA. Denies SOB.  GI/: Adequate urine output. No BM this shift.  Diet/appetite: Tolerating regular diet. NPO at midnight for endoscopy in morning. PRN zofran given for nausea following oxycodone.  Activity:  Up ad wendy, ambulating in room.  Pain: Complaining of headache at beginning of shift, relieved with oxycodone.   Skin: No new deficits noted.  LDA's: R PIV saline locked.    Plan: Endoscopy in morning. IV protonix. Continue with POC. Notify primary team with changes.

## 2022-08-30 LAB
PATH REPORT.COMMENTS IMP SPEC: NORMAL
PATH REPORT.COMMENTS IMP SPEC: NORMAL
PATH REPORT.FINAL DX SPEC: NORMAL
PATH REPORT.GROSS SPEC: NORMAL
PATH REPORT.MICROSCOPIC SPEC OTHER STN: NORMAL
PATH REPORT.RELEVANT HX SPEC: NORMAL
PHOTO IMAGE: NORMAL

## 2022-08-30 PROCEDURE — 88305 TISSUE EXAM BY PATHOLOGIST: CPT | Mod: 26 | Performed by: PATHOLOGY

## 2022-08-31 ENCOUNTER — PATIENT OUTREACH (OUTPATIENT)
Dept: CARE COORDINATION | Facility: CLINIC | Age: 57
End: 2022-08-31

## 2022-08-31 NOTE — PROGRESS NOTES
Clinic Care Coordination Contact  [facility]: Post-Discharge Note  SITUATION                                                      Admission:    Admission Date: 08/28/22   Reason for Admission: Upper GI bleed    Arthritis of knee    Gastrointestinal hemorrhage with melena  Discharge:   Discharge Date: 08/29/22  Discharge Diagnosis: Upper GI bleed    Arthritis of knee    Gastrointestinal hemorrhage with melena    BACKGROUND                                                      Per hospital discharge summary and inpatient provider notes:  57 y/o M with PMH of PCI in 2016 who takes 81mg asa x 2 daily as well as 325mg asa for arthritic pain and headache several times a week presented with melena and ABLA.  EGD on 8/29/22 showed peptic ulcer x 2.    - BID PPI for 2 months and repeat EGD, GI to schedule  - At discharge will cut down to daily 81mg asa to start 1 week from today.   Diclofenac gel prescribed to replace asa 325.  Patient refuses to take tylenol.   - Iron level slightly low at 54 but margaret level 94 which is WNL.  CTM     Regarding CAD:  - Patient apparently takes metoprolol PRN.  Discuss with PCP  - Patient apparently doesn't take statin.  Discuss with PCP.     ASSESSMENT           Discharge Assessment  How are you doing now that you are home?: Pt reports he is well. He has all follow up appointments scheduled. No immediate concerns.  How are your symptoms? (Red Flag symptoms escalate to triage hotline per guidelines): Improved  Do you feel your condition is stable enough to be safe at home until your provider visit?: Yes  Does the patient have their discharge instructions? : Yes  Does the patient have questions regarding their discharge instructions? : No  Were you started on any new medications or were there changes to any of your previous medications? : Yes  Does the patient have all of their medications?: Yes  Do you have questions regarding any of your medications? : No  Do you have all of your  needed medical supplies or equipment (DME)?  (i.e. oxygen tank, CPAP, cane, etc.): Yes              PLAN                                                      Outpatient Plan:   Follow up with primary care provider, Amaya Casey, within 7 days for   hospital follow- up.  The following labs/tests are recommended: CBC.       Appointments on [place] and/or [facility] (with [facility] or [facility]   provider or service). Call [phone] if you haven't heard regarding   these appointments within 7 days of discharge.     No future appointments.      For any urgent concerns, please contact our 24 hour nurse triage line: [phone] ([phone])         TIMOTHY Rios  Connected Care Resource Center  [facility]     *Connected Care Resource Team does NOT follow patient ongoing. Referrals are identified based on internal discharge reports and the outreach is to ensure patient has an understanding of their discharge instructions.

## 2022-11-19 ENCOUNTER — HEALTH MAINTENANCE LETTER (OUTPATIENT)
Age: 57
End: 2022-11-19

## 2022-12-18 ENCOUNTER — HEALTH MAINTENANCE LETTER (OUTPATIENT)
Age: 57
End: 2022-12-18

## 2024-01-28 ENCOUNTER — HEALTH MAINTENANCE LETTER (OUTPATIENT)
Age: 59
End: 2024-01-28

## 2025-02-01 ENCOUNTER — HEALTH MAINTENANCE LETTER (OUTPATIENT)
Age: 60
End: 2025-02-01

## (undated) RX ORDER — ONDANSETRON 2 MG/ML
INJECTION INTRAMUSCULAR; INTRAVENOUS
Status: DISPENSED
Start: 2022-08-29

## (undated) RX ORDER — FENTANYL CITRATE 50 UG/ML
INJECTION, SOLUTION INTRAMUSCULAR; INTRAVENOUS
Status: DISPENSED
Start: 2022-08-29